# Patient Record
Sex: MALE | Race: WHITE | Employment: UNEMPLOYED | URBAN - METROPOLITAN AREA
[De-identification: names, ages, dates, MRNs, and addresses within clinical notes are randomized per-mention and may not be internally consistent; named-entity substitution may affect disease eponyms.]

---

## 2019-05-15 ENCOUNTER — OFFICE VISIT (OUTPATIENT)
Dept: FAMILY MEDICINE CLINIC | Age: 24
End: 2019-05-15

## 2019-05-15 VITALS
HEART RATE: 93 BPM | BODY MASS INDEX: 34.23 KG/M2 | RESPIRATION RATE: 18 BRPM | WEIGHT: 213 LBS | SYSTOLIC BLOOD PRESSURE: 113 MMHG | TEMPERATURE: 98.4 F | OXYGEN SATURATION: 90 % | HEIGHT: 66 IN | DIASTOLIC BLOOD PRESSURE: 71 MMHG

## 2019-05-15 DIAGNOSIS — F84.5 ASPERGER'S SYNDROME: ICD-10-CM

## 2019-05-15 DIAGNOSIS — J45.30 MILD PERSISTENT ASTHMA WITHOUT COMPLICATION: ICD-10-CM

## 2019-05-15 DIAGNOSIS — J30.9 ALLERGIC RHINITIS, UNSPECIFIED SEASONALITY, UNSPECIFIED TRIGGER: ICD-10-CM

## 2019-05-15 DIAGNOSIS — Z00.00 ROUTINE GENERAL MEDICAL EXAMINATION AT A HEALTH CARE FACILITY: Primary | ICD-10-CM

## 2019-05-15 RX ORDER — ALBUTEROL SULFATE 90 UG/1
1 AEROSOL, METERED RESPIRATORY (INHALATION)
Qty: 1 INHALER | Refills: 3 | Status: SHIPPED | OUTPATIENT
Start: 2019-05-15

## 2019-05-15 RX ORDER — SERTRALINE HYDROCHLORIDE 25 MG/1
150 TABLET, FILM COATED ORAL
COMMUNITY

## 2019-05-15 NOTE — PROGRESS NOTES
HPI 
Roscoe Britt is a 24 y.o. male who presents for a checkup. Last seen . Has Asperger's syndrome. Is living in a apartment that has been set aside for people on the spectrum. Has counselors that work with him on healthy living skills. He is exercising to [employer] which he can apply to things like [email or web address]. He does not need a particularly healthy diet but we did not get into the specifics. Has asthma for which he takes Singulair and a albuterol inhaler. Has not required albuterol inhaler in many months. Has a steroid inhaler that he is prescribed but he does not take. Follows with Dr. Stefan Venegas for this Follows up with psychiatry and is on a number of psych medications. He has not been getting blood work done with psych He has a family history of heart disease on his father's side. Father has atrial fibrillation, grandfather  at age 48 and paternal grandfather  at age 48. Has a family history of colon cancer in paternal grandmother and paternal great grandfather. Grandmother was diagnosed age 61. Family history maternal grandmother with diabetes PMHx: 
Past Medical History:  
Diagnosis Date  Asperger's syndrome  Elevated blood pressure reading without diagnosis of hypertension 2010  Rash and other nonspecific skin eruption 2010  Unspecified asthma(493.90) 2010  Unspecified pruritic disorder 2010 Meds:  
Current Outpatient Medications Medication Sig Dispense Refill  sertraline (ZOLOFT) 25 mg tablet Take 25 mg by mouth.  albuterol (PROVENTIL HFA, VENTOLIN HFA, PROAIR HFA) 90 mcg/actuation inhaler Take 1 Puff by inhalation every four (4) hours as needed for Wheezing.  1 Inhaler 3  
 fluticasone (FLONASE) 50 mcg/actuation nasal spray TAKE 1 SPRAY IN EACH NOSTRIL DAILY 1 Bottle 5  
 montelukast (SINGULAIR) 10 mg tablet TAKE ONE TABLET   BY MOUTH   DAILY 90 Tab 3  
  ARIPiprazole (ABILIFY) 20 mg tablet Take 20 mg by mouth daily.  albuterol (PROVENTIL, VENTOLIN) 90 mcg/Actuation inhaler Take 2 Puffs by inhalation every six (6) hours as needed.  dexmethylphenidate (FOCALIN XR) 20 mg MP50 Take 3 Tabs by mouth every morning.  OLANZAPINE (ZYPREXA PO) Take 20 mg by mouth nightly. Allergies: Allergies Allergen Reactions  Penicillins Unknown (comments)  Resperal [D-Ngbvgiuwdqsq-Hiqcsfufe-Pe-Dm] Other (comments) Nasal congestion and increased heart rate  Seroquel [Quetiapine] Unknown (comments)  Sulfa (Sulfonamide Antibiotics) Unknown (comments) Smoker: 
Social History Tobacco Use Smoking Status Never Smoker Smokeless Tobacco Never Used ETOH:  
Social History Substance and Sexual Activity Alcohol Use No  
 Alcohol/week: 0.0 oz  
 
 
FH: No family history on file. ROS:  
As listed in HPI. In addition: 
Constitutional:   No headache, fever, fatigue, weight loss or weight gain Cardiac:    No chest pain Resp:   No cough or shortness of breath Neuro   No loss of consciousness, dizziness, seizures Physical Exam: 
Pulse 93, temperature 98.4 °F (36.9 °C), resp. rate 18, height 5' 6\" (1.676 m), weight 213 lb (96.6 kg), SpO2 90 %. GEN: No apparent distress. Alert and oriented and responds to all questions appropriately. NEUROLOGIC:  No focal neurologic deficits. Strength and sensation grossly intact. Coordination and gait grossly intact. EXT: Well perfused. No edema. SKIN: No obvious rashes. Lungs clear to auscultation bilaterally CV regular rate and rhythm no murmur HEENT clear tympanic membrane right-sided nasal congestion and erythema, clear left side. Shotty lymphadenopathy, cobblestoning and postnasal drip Assessment and Plan Wellness Diet and exercise Surveillance labs Primarily here today because his psychiatrist asked if he was getting checkups. Medicines that he is taking should be getting annual routine blood work Asthma appears to be under good control They have worked on allergy trigger reduction Not requiring albuterol inhaler at all Probably okay to stay off the Pulmicort if it is such a chore to take it. Allergies Right-sided nasal congestion. Does not appear to be under great control. Suggest adding Zyrtec or Allegra Singulair Annual follow-up or sooner based on blood work Triglycerides 300. Probably diet LDL is normal which is reassuring given his family history and antipsychotic use Sugar is fine Creatinine is elevated, surprising for his age. Can be a side effect of Zyprexa. Check in 1 month with lab visit ICD-10-CM ICD-9-CM 1. Routine general medical examination at a health care facility Z00.00 V70.0 TSH 3RD GENERATION  
   METABOLIC PANEL, COMPREHENSIVE  
   CBC WITH AUTOMATED DIFF  
   LIPID PANEL  
   HEMOGLOBIN A1C WITH EAG  
   CT HANDLG&/OR CONVEY OF SPEC FOR TR OFFICE TO LAB  
   COLLECTION VENOUS BLOOD,VENIPUNCTURE 2. Asperger's syndrome F84.5 299.80   
3. Mild persistent asthma without complication Y37.48 660.40 albuterol (PROVENTIL HFA, VENTOLIN HFA, PROAIR HFA) 90 mcg/actuation inhaler 4. Allergic rhinitis, unspecified seasonality, unspecified trigger J30.9 477.9 AVS given. Pt expressed understanding of instructions

## 2019-05-15 NOTE — PROGRESS NOTES
. 
Chief Complaint Patient presents with  Physical  
 
.1. Have you been to the ER, urgent care clinic since your last visit? Hospitalized since your last visit? Urgent care for sore throat 2. Have you seen or consulted any other health care providers outside of the [address] [place] [address] since your last visit? Include any pap smears or colon screening. No 
 
. Health Maintenance Due Topic Date Due  
 DTaP/Tdap/Td series (2 - Td) 09/30/2013 Kiarra Beltrán

## 2019-05-15 NOTE — PATIENT INSTRUCTIONS
Food as Fuel: Care Instructions Your Care Instructions A healthy, balanced diet gives your body nutrients. Nutrients are like fuel for your body. They give you energy. And they keep your heart beating, your brain active, and your muscles working. They also help to build and strengthen bones, muscles, and other body tissues. Your body needs three major nutrients for energy. These are carbohydrate, protein, and fat. · Carbohydrate provides energy for your brain, muscles, heart, and lungs. It is found in bread, cereal, rice, pasta, fruits, vegetables, milk, yogurt, and sugar. · Protein provides energy and helps build and repair your body's cells. It is found in meat, poultry, fish, cooked dry beans, cheese, tofu and other soy products, nuts and seeds, and milk and milk products. · Fat provides energy, helps build the covering around nerves in your body, and helps make hormones. Fat is found in butter, margarine, oil, mayonnaise, salad dressing, and nuts. It is also found in most foods that come from animals, such as meat and milk products. Many foods also have fat added to them. Your body needs all three of these nutrients to be healthy. If you choose a good mix of foods, you can help your body get the right amounts of carbohydrate, protein, and fat. It can also keep you at a healthy weight. Follow-up care is a key part of your treatment and safety. Be sure to make and go to all appointments, and call your doctor if you are having problems. It's also a good idea to know your test results and keep a list of the medicines you take. How can you care for yourself at home? Eat a balanced diet · Try to eat variety of healthy foods every day. These include: 
? 5 to 8 ounces of bread, cereal, crackers, rice, or pasta. An ounce is about 1 slice of bread, ¾ cup of breakfast cereal, or ½ cup of cooked rice, cereal, or pasta. Choose whole-grain products for at least half of your grain servings. ? 2 to 3 cups of vegetables. Be sure to include: § Dark green vegetables such as broccoli and spinach. § Orange vegetables such as carrots and sweet potatoes. ? 1½ to 2 cups of fresh, frozen, or canned fruit. A banana, an orange, or a large apple equals 1 cup. ? 3 cups of nonfat or low-fat milk, yogurt, or other milk products. ? 5 to 6½ ounces of protein foods. These include chicken, fish, lean meat, beans, nuts, and seeds. One egg equals 1 ounce of meat, poultry, or fish. A ½ cup of cooked beans equals 2 ounces of protein. Stay fueled all day · Start your day with breakfast. If you don't have time to sit down for a bowl of cereal in the morning, try something that you can eat \"on the go. \" Try a piece of whole wheat bread with peanut butter or a container of yogurt with frozen berries mixed in. · Eat regularly scheduled meals and snacks. If you miss a meal, you may overeat at the next meal. Or you may choose a less healthy snack. · Drink enough water. (If you have kidney, heart, or liver disease and have to limit fluids, talk with your doctor before you increase your fluid intake.) Where can you learn more? Go to http://robby-carolyn.info/. Enter U125 in the search box to learn more about \"Food as Fuel: Care Instructions. \" Current as of: March 28, 2018 Content Version: 11.9 © 8130-4447 [employer]. Care instructions adapted under license by [employer] (which disclaims liability or warranty for this information). If you have questions about a medical condition or this instruction, always ask your healthcare professional. [place] [address] any warranty or liability for your use of this information.

## 2019-05-16 ENCOUNTER — TELEPHONE (OUTPATIENT)
Dept: FAMILY MEDICINE CLINIC | Age: 24
End: 2019-05-16

## 2019-05-16 DIAGNOSIS — R79.89 ELEVATED SERUM CREATININE: Primary | ICD-10-CM

## 2019-05-16 LAB
ALBUMIN SERPL-MCNC: 4.6 G/DL (ref 3.5–5.5)
ALBUMIN/GLOB SERPL: 2 {RATIO} (ref 1.2–2.2)
ALP SERPL-CCNC: 63 IU/L (ref 39–117)
ALT SERPL-CCNC: 24 IU/L (ref 0–44)
AST SERPL-CCNC: 19 IU/L (ref 0–40)
BASOPHILS # BLD AUTO: 0 X10E3/UL (ref 0–0.2)
BASOPHILS NFR BLD AUTO: 0 %
BILIRUB SERPL-MCNC: 0.4 MG/DL (ref 0–1.2)
BUN SERPL-MCNC: 12 MG/DL (ref 6–20)
BUN/CREAT SERPL: 9 (ref 9–20)
CALCIUM SERPL-MCNC: 9.6 MG/DL (ref 8.7–10.2)
CHLORIDE SERPL-SCNC: 104 MMOL/L (ref 96–106)
CHOLEST SERPL-MCNC: 162 MG/DL (ref 100–199)
CO2 SERPL-SCNC: 22 MMOL/L (ref 20–29)
CREAT SERPL-MCNC: 1.32 MG/DL (ref 0.76–1.27)
EOSINOPHIL # BLD AUTO: 0.1 X10E3/UL (ref 0–0.4)
EOSINOPHIL NFR BLD AUTO: 1 %
ERYTHROCYTE [DISTWIDTH] IN BLOOD BY AUTOMATED COUNT: 13.6 % (ref 12.3–15.4)
EST. AVERAGE GLUCOSE BLD GHB EST-MCNC: 97 MG/DL
GLOBULIN SER CALC-MCNC: 2.3 G/DL (ref 1.5–4.5)
GLUCOSE SERPL-MCNC: 61 MG/DL (ref 65–99)
HBA1C MFR BLD: 5 % (ref 4.8–5.6)
HCT VFR BLD AUTO: 45.9 % (ref 37.5–51)
HDLC SERPL-MCNC: 28 MG/DL
HGB BLD-MCNC: 15 G/DL (ref 13–17.7)
IMM GRANULOCYTES # BLD AUTO: 0 X10E3/UL (ref 0–0.1)
IMM GRANULOCYTES NFR BLD AUTO: 0 %
INTERPRETATION, 910389: NORMAL
LDLC SERPL CALC-MCNC: 74 MG/DL (ref 0–99)
LYMPHOCYTES # BLD AUTO: 2.3 X10E3/UL (ref 0.7–3.1)
LYMPHOCYTES NFR BLD AUTO: 30 %
MCH RBC QN AUTO: 31.1 PG (ref 26.6–33)
MCHC RBC AUTO-ENTMCNC: 32.7 G/DL (ref 31.5–35.7)
MCV RBC AUTO: 95 FL (ref 79–97)
MONOCYTES # BLD AUTO: 0.5 X10E3/UL (ref 0.1–0.9)
MONOCYTES NFR BLD AUTO: 6 %
NEUTROPHILS # BLD AUTO: 4.8 X10E3/UL (ref 1.4–7)
NEUTROPHILS NFR BLD AUTO: 63 %
PLATELET # BLD AUTO: 238 X10E3/UL (ref 150–379)
POTASSIUM SERPL-SCNC: 4.7 MMOL/L (ref 3.5–5.2)
PROT SERPL-MCNC: 6.9 G/DL (ref 6–8.5)
RBC # BLD AUTO: 4.83 X10E6/UL (ref 4.14–5.8)
SODIUM SERPL-SCNC: 142 MMOL/L (ref 134–144)
TRIGL SERPL-MCNC: 300 MG/DL (ref 0–149)
TSH SERPL DL<=0.005 MIU/L-ACNC: 0.69 UIU/ML (ref 0.45–4.5)
VLDLC SERPL CALC-MCNC: 60 MG/DL (ref 5–40)
WBC # BLD AUTO: 7.6 X10E3/UL (ref 3.4–10.8)

## 2019-05-16 NOTE — TELEPHONE ENCOUNTER
Your blood sugar looks fine. Your triglycerides are high, this is the fat in your blood. It is very dependent on your diet. You need to avoid fried foods, greasy foods, saturated fats which can be found and red meat and dairy products. We like the kind of fat that you can get from fish and vegetables. Chicken would be a reasonable compromise. These diet changes would also help your HDL \"good cholesterol\". Your bad cholesterol actually looks fine. Your kidney function is a little off.   I suspect this is a fluke but I would like to check blood work in 1 month to be sure

## 2019-05-16 NOTE — TELEPHONE ENCOUNTER
Ms. Riya Mccoyjorge alberto notified Dr. Evita Chawla said Your blood sugar looks fine.     Your triglycerides are high, this is the fat in your blood. It is very dependent on your diet. You need to avoid fried foods, greasy foods, saturated fats which can be found and red meat and dairy products. We like the kind of fat that you can get from fish and vegetables. Chicken would be a reasonable compromise. These diet changes would also help your HDL \"good cholesterol\". Your bad cholesterol actually looks fine.     Your kidney function is a little off.   I suspect this is a fluke but I would like to check blood work in 1 month to be sure    He voiced understanding and will call back to set up lab apt

## 2019-05-20 ENCOUNTER — TELEPHONE (OUTPATIENT)
Dept: FAMILY MEDICINE CLINIC | Age: 24
End: 2019-05-20

## 2019-05-20 NOTE — TELEPHONE ENCOUNTER
Patients mom Gemma Navarro is calling to speak with a  Nurse. She stated that she was told that the patient needed to come in for further testing and she would like to know if an appointment is needed.

## 2019-05-21 NOTE — TELEPHONE ENCOUNTER
Unable to reach mother or leave a message.  Need to let her know to set up lab appointment for [facility] for the middle of June

## 2019-05-22 ENCOUNTER — TELEPHONE (OUTPATIENT)
Dept: FAMILY MEDICINE CLINIC | Age: 24
End: 2019-05-22

## 2019-05-22 NOTE — TELEPHONE ENCOUNTER
Mother calling to get lab order faxed to [facility]. She will call to make appt at the end of the month for June.      I got her correct number, she can be reached at [phone]

## 2019-05-22 NOTE — TELEPHONE ENCOUNTER
verified. Informed mother (Judith-HIPAA) that the lab requisition form faxed. Understanding verbalized.

## 2019-05-31 ENCOUNTER — HOSPITAL ENCOUNTER (EMERGENCY)
Age: 24
Discharge: HOME | End: 2019-05-31
Attending: EMERGENCY MEDICINE
Payer: COMMERCIAL

## 2019-05-31 VITALS
DIASTOLIC BLOOD PRESSURE: 49 MMHG | HEART RATE: 77 BPM | SYSTOLIC BLOOD PRESSURE: 118 MMHG | TEMPERATURE: 98.4 F | OXYGEN SATURATION: 99 % | RESPIRATION RATE: 18 BRPM

## 2019-05-31 DIAGNOSIS — K92.2 GASTROINTESTINAL HEMORRHAGE, UNSPECIFIED GASTROINTESTINAL HEMORRHAGE TYPE: Primary | ICD-10-CM

## 2019-05-31 LAB
ALBUMIN SERPL-MCNC: 4.3 G/DL (ref 3.5–5)
ALBUMIN/GLOB SERPL: 1.3 {RATIO} (ref 1.1–2.2)
ALP SERPL-CCNC: 67 U/L (ref 45–117)
ALT SERPL-CCNC: 49 U/L (ref 12–78)
ANION GAP SERPL CALC-SCNC: 5 MMOL/L (ref 5–15)
AST SERPL-CCNC: 26 U/L (ref 15–37)
BASOPHILS # BLD: 0.1 K/UL (ref 0–0.1)
BASOPHILS NFR BLD: 1 % (ref 0–1)
BILIRUB SERPL-MCNC: 0.4 MG/DL (ref 0.2–1)
BUN SERPL-MCNC: 14 MG/DL (ref 6–20)
BUN/CREAT SERPL: 13 (ref 12–20)
CALCIUM SERPL-MCNC: 9.5 MG/DL (ref 8.5–10.1)
CHLORIDE SERPL-SCNC: 109 MMOL/L (ref 97–108)
CO2 SERPL-SCNC: 29 MMOL/L (ref 21–32)
COMMENT, HOLDF: NORMAL
CREAT SERPL-MCNC: 1.11 MG/DL (ref 0.7–1.3)
DIFFERENTIAL METHOD BLD: NORMAL
EOSINOPHIL # BLD: 0.1 K/UL (ref 0–0.4)
EOSINOPHIL NFR BLD: 1 % (ref 0–7)
ERYTHROCYTE [DISTWIDTH] IN BLOOD BY AUTOMATED COUNT: 12.5 % (ref 11.5–14.5)
GLOBULIN SER CALC-MCNC: 3.3 G/DL (ref 2–4)
GLUCOSE SERPL-MCNC: 82 MG/DL (ref 65–100)
HCT VFR BLD AUTO: 49 % (ref 36.6–50.3)
HEMOCCULT STL QL: POSITIVE
HGB BLD-MCNC: 16.1 G/DL (ref 12.1–17)
IMM GRANULOCYTES # BLD AUTO: 0 K/UL (ref 0–0.04)
IMM GRANULOCYTES NFR BLD AUTO: 0 % (ref 0–0.5)
LYMPHOCYTES # BLD: 3 K/UL (ref 0.8–3.5)
LYMPHOCYTES NFR BLD: 36 % (ref 12–49)
MCH RBC QN AUTO: 31.4 PG (ref 26–34)
MCHC RBC AUTO-ENTMCNC: 32.9 G/DL (ref 30–36.5)
MCV RBC AUTO: 95.7 FL (ref 80–99)
MONOCYTES # BLD: 0.5 K/UL (ref 0–1)
MONOCYTES NFR BLD: 5 % (ref 5–13)
NEUTS SEG # BLD: 4.8 K/UL (ref 1.8–8)
NEUTS SEG NFR BLD: 57 % (ref 32–75)
NRBC # BLD: 0 K/UL (ref 0–0.01)
NRBC BLD-RTO: 0 PER 100 WBC
PLATELET # BLD AUTO: 203 K/UL (ref 150–400)
PMV BLD AUTO: 10.8 FL (ref 8.9–12.9)
POTASSIUM SERPL-SCNC: 3.9 MMOL/L (ref 3.5–5.1)
PROT SERPL-MCNC: 7.6 G/DL (ref 6.4–8.2)
RBC # BLD AUTO: 5.12 M/UL (ref 4.1–5.7)
SAMPLES BEING HELD,HOLD: NORMAL
SODIUM SERPL-SCNC: 143 MMOL/L (ref 136–145)
WBC # BLD AUTO: 8.3 K/UL (ref 4.1–11.1)

## 2019-05-31 PROCEDURE — 36415 COLL VENOUS BLD VENIPUNCTURE: CPT

## 2019-05-31 PROCEDURE — 80053 COMPREHEN METABOLIC PANEL: CPT

## 2019-05-31 PROCEDURE — 82272 OCCULT BLD FECES 1-3 TESTS: CPT

## 2019-05-31 PROCEDURE — 85025 COMPLETE CBC W/AUTO DIFF WBC: CPT

## 2019-05-31 PROCEDURE — 99283 EMERGENCY DEPT VISIT LOW MDM: CPT

## 2019-06-01 NOTE — ED NOTES
MD reviewed discharge instructions and options with patient and patient verbalized understanding. RN reviewed discharge instructions using teachback method. Pt ambulated to exit without difficulty and in no signs of acute distress escorted by family, and they  will drive home. No complaints or needs expressed at this time. Patient was counseled on medications prescribed at discharge. VSS, verbalized relief from most intense pain. Patient to call GI in the morning for appointment.

## 2019-06-01 NOTE — ED TRIAGE NOTES
Pt presents ambulatory to department. Pt states he has been having diarrhea that began this afternoon and at 1930 he noticed some bright red blood in his stool.

## 2019-06-01 NOTE — ED PROVIDER NOTES
HPI       24y M here with blood in his stool. Has had diarrhea today. Around 7:30p today he noticed blood with the stool. No abdominal pain. No recent travel. No recent abx use. No dizziness. No syncope. No nausea or vomiting. Past Medical History:   Diagnosis Date    Asperger's syndrome     Elevated blood pressure reading without diagnosis of hypertension 2/26/2010    Rash and other nonspecific skin eruption 2/26/2010    Unspecified asthma(493.90) 2/26/2010    Unspecified pruritic disorder 2/26/2010       Past Surgical History:   Procedure Laterality Date    HX TYMPANOSTOMY      1998         History reviewed. No pertinent family history.     Social History     Socioeconomic History    Marital status: SINGLE     Spouse name: Not on file    Number of children: Not on file    Years of education: Not on file    Highest education level: Not on file   Occupational History    Not on file   Social Needs    Financial resource strain: Not on file    Food insecurity:     Worry: Not on file     Inability: Not on file    Transportation needs:     Medical: Not on file     Non-medical: Not on file   Tobacco Use    Smoking status: Never Smoker    Smokeless tobacco: Never Used   Substance and Sexual Activity    Alcohol use: No     Alcohol/week: 0.0 oz    Drug use: No    Sexual activity: Never   Lifestyle    Physical activity:     Days per week: Not on file     Minutes per session: Not on file    Stress: Not on file   Relationships    Social connections:     Talks on phone: Not on file     Gets together: Not on file     Attends Voodoo service: Not on file     Active member of club or organization: Not on file     Attends meetings of clubs or organizations: Not on file     Relationship status: Not on file    Intimate partner violence:     Fear of current or ex partner: Not on file     Emotionally abused: Not on file     Physically abused: Not on file     Forced sexual activity: Not on file   Other Topics Concern    Not on file   Social History Narrative    21yo with Asperger's lives with mom. Unable to drive. Has DD waiver - personal assistance 30 hours/week. ALLERGIES: Penicillins; Resperal [w-zuvgcggunbwr-zbpfgfuax-pe-dm]; Seroquel [quetiapine]; and Sulfa (sulfonamide antibiotics)    Review of Systems   Review of Systems   Constitutional: (-) weight loss. HEENT: (-) stiff neck   Eyes: (-) discharge. Respiratory: (-) cough. Cardiovascular: (-) syncope. Gastrointestinal: (-) blood in stool. Genitourinary: (-) hematuria. Musculoskeletal: (-) myalgias. Neurological: (-) seizure. Skin: (-) petechiae  Lymph/Immunologic: (-) enlarged lymph nodes  All other systems reviewed and are negative. Vitals:    05/31/19 2011   Pulse: 88   SpO2: 98%            Physical Exam Nursing note and vitals reviewed. Constitutional: oriented to person, place, and time. appears well-developed and well-nourished. No distress. Head: Normocephalic and atraumatic. Sclera anicteric  Nose: No rhinorrhea  Mouth/Throat: Oropharynx is clear and moist. Pharynx normal  Eyes: Conjunctivae are normal. Pupils are equal, round, and reactive to light. Right eye exhibits no discharge. Left eye exhibits no discharge. Neck: Painless normal range of motion. Neck supple. No LAD. Cardiovascular: Normal rate, regular rhythm, normal heart sounds and intact distal pulses. Exam reveals no gallop and no friction rub. No murmur heard. Pulmonary/Chest:  No respiratory distress. No wheezes. No rales. No rhonchi. No increased work of breathing. No accessory muscle use. Good air exchange throughout. Abdominal: soft, non-tender, no rebound or guarding. No hepatosplenomegaly. Normal bowel sounds throughout. Back: no tenderness to palpation, no deformities, no CVA tenderness  Extremities/Musculoskeletal: Normal range of motion. no tenderness. No edema. Distal extremities are neurovasc intact. Lymphadenopathy:   No adenopathy. Neurological:  Alert and oriented to person, place, and time. Coordination normal. CN 2-12 intact. Motor and sensory function intact. Skin: Skin is warm and dry. No rash noted. No pallor. MDM 24y M here with blood in his stool. Plan for labs and reeval. Appears well. Procedures      11:15 PM  Pt continues to feel well. No recurrence of sx's. Labs reassuring. Vitals stable while in the ED. Offered admission but they prefer to go home and will come back if sx's come back or worsen. Otherwise will follow-up with GI early next week.

## 2019-06-01 NOTE — DISCHARGE INSTRUCTIONS
Patient Education        Gastrointestinal Bleeding: Care Instructions  Your Care Instructions    The digestive or gastrointestinal tract goes from the mouth to the anus. It is often called the GI tract. Bleeding can happen anywhere in the GI tract. It may be caused by an ulcer, an infection, or cancer. It may also be caused by medicines such as aspirin or ibuprofen. Light bleeding may not cause any symptoms at first. But if you continue to bleed for a while, you may feel very weak or tired. Sudden, heavy bleeding means you need to see a doctor right away. This kind of bleeding can be very dangerous. But it can usually be cured or controlled. The doctor may do some tests to find the cause of your bleeding. Follow-up care is a key part of your treatment and safety. Be sure to make and go to all appointments, and call your doctor if you are having problems. It's also a good idea to know your test results and keep a list of the medicines you take. How can you care for yourself at home? · Be safe with medicines. Take your medicines exactly as prescribed. Call your doctor if you think you are having a problem with your medicine. You will get more details on the specific medicines your doctor prescribes. · Do not take aspirin or other anti-inflammatory medicines, such as naproxen (Aleve) or ibuprofen (Advil, Motrin), without talking to your doctor first. Ask your doctor if it is okay to use acetaminophen (Tylenol). · Do not drink alcohol. · The bleeding may make you lose iron. So it's important to eat foods that have a lot of iron. These include red meat, shellfish, poultry, and eggs. They also include beans, raisins, whole-grain breads, and leafy green vegetables. If you want help planning meals, you can make an appointment with a dietitian. When should you call for help? Call 911 anytime you think you may need emergency care.  For example, call if:    · You have sudden, severe belly pain.     · You vomit blood or what looks like coffee grounds.     · You passed out (lost consciousness).     · Your stools are maroon or very bloody.    Call your doctor now or seek immediate medical care if:    · You are dizzy or lightheaded, or you feel like you may faint.     · Your stools are black and look like tar, or they have streaks of blood.     · You have belly pain.     · You vomit or have nausea.     · You have trouble swallowing, or it hurts when you swallow.    Watch closely for changes in your health, and be sure to contact your doctor if:    · You do not get better as expected. Where can you learn more? Go to http://robby-carolyn.info/. Enter T282 in the search box to learn more about \"Gastrointestinal Bleeding: Care Instructions. \"  Current as of: September 23, 2018  Content Version: 11.9  © 9506-4882 [employer]. Care instructions adapted under license by [employer] (which disclaims liability or warranty for this information). If you have questions about a medical condition or this instruction, always ask your healthcare professional. [address] any warranty or liability for your use of this information.

## 2019-06-11 ENCOUNTER — DOCUMENTATION ONLY (OUTPATIENT)
Dept: FAMILY MEDICINE CLINIC | Age: 24
End: 2019-06-11

## 2019-06-11 NOTE — PROGRESS NOTES
Chief Complaint   Patient presents with    Documentation     [address] \"DR. Delmi Sorto"     Last office notes and most recent labs faxed to [phone] and confirmation page received.

## 2019-08-26 ENCOUNTER — ANESTHESIA EVENT (OUTPATIENT)
Dept: ENDOSCOPY | Age: 24
End: 2019-08-26
Payer: COMMERCIAL

## 2019-08-26 NOTE — ANESTHESIA PREPROCEDURE EVALUATION
Relevant Problems   No relevant active problems       Anesthetic History   No history of anesthetic complications            Review of Systems / Medical History  Patient summary reviewed, nursing notes reviewed and pertinent labs reviewed    Pulmonary            Asthma        Neuro/Psych   Within defined limits           Cardiovascular  Within defined limits                     GI/Hepatic/Renal  Within defined limits              Endo/Other        Obesity     Other Findings            Physical Exam    Airway  Mallampati: II  TM Distance: > 6 cm  Neck ROM: normal range of motion   Mouth opening: Normal     Cardiovascular  Regular rate and rhythm,  S1 and S2 normal,  no murmur, click, rub, or gallop             Dental  No notable dental hx       Pulmonary  Breath sounds clear to auscultation               Abdominal  GI exam deferred       Other Findings            Anesthetic Plan    ASA: 2  Anesthesia type: MAC            Anesthetic plan and risks discussed with: Patient

## 2019-08-27 ENCOUNTER — HOSPITAL ENCOUNTER (OUTPATIENT)
Age: 24
Setting detail: OUTPATIENT SURGERY
Discharge: HOME | End: 2019-08-27
Attending: SPECIALIST | Admitting: SPECIALIST
Payer: COMMERCIAL

## 2019-08-27 ENCOUNTER — ANESTHESIA (OUTPATIENT)
Dept: ENDOSCOPY | Age: 24
End: 2019-08-27
Payer: COMMERCIAL

## 2019-08-27 VITALS
DIASTOLIC BLOOD PRESSURE: 70 MMHG | OXYGEN SATURATION: 96 % | WEIGHT: 213 LBS | HEART RATE: 70 BPM | HEIGHT: 66 IN | SYSTOLIC BLOOD PRESSURE: 119 MMHG | BODY MASS INDEX: 34.23 KG/M2 | RESPIRATION RATE: 15 BRPM | TEMPERATURE: 97.7 F

## 2019-08-27 PROCEDURE — 74011250636 HC RX REV CODE- 250/636: Performed by: NURSE ANESTHETIST, CERTIFIED REGISTERED

## 2019-08-27 PROCEDURE — 76060000032 HC ANESTHESIA 0.5 TO 1 HR: Performed by: SPECIALIST

## 2019-08-27 PROCEDURE — 76040000007: Performed by: SPECIALIST

## 2019-08-27 RX ORDER — ATROPINE SULFATE 0.1 MG/ML
0.5 INJECTION INTRAVENOUS
Status: DISCONTINUED | OUTPATIENT
Start: 2019-08-27 | End: 2019-08-27 | Stop reason: HOSPADM

## 2019-08-27 RX ORDER — SODIUM CHLORIDE 0.9 % (FLUSH) 0.9 %
5-40 SYRINGE (ML) INJECTION EVERY 8 HOURS
Status: DISCONTINUED | OUTPATIENT
Start: 2019-08-27 | End: 2019-08-27 | Stop reason: HOSPADM

## 2019-08-27 RX ORDER — SODIUM CHLORIDE 9 MG/ML
INJECTION, SOLUTION INTRAVENOUS
Status: DISCONTINUED | OUTPATIENT
Start: 2019-08-27 | End: 2019-08-27 | Stop reason: HOSPADM

## 2019-08-27 RX ORDER — SODIUM CHLORIDE 0.9 % (FLUSH) 0.9 %
5-40 SYRINGE (ML) INJECTION AS NEEDED
Status: DISCONTINUED | OUTPATIENT
Start: 2019-08-27 | End: 2019-08-27 | Stop reason: HOSPADM

## 2019-08-27 RX ORDER — LIDOCAINE HYDROCHLORIDE 20 MG/ML
INJECTION, SOLUTION EPIDURAL; INFILTRATION; INTRACAUDAL; PERINEURAL AS NEEDED
Status: DISCONTINUED | OUTPATIENT
Start: 2019-08-27 | End: 2019-08-27 | Stop reason: HOSPADM

## 2019-08-27 RX ORDER — EPINEPHRINE 0.1 MG/ML
1 INJECTION INTRACARDIAC; INTRAVENOUS
Status: DISCONTINUED | OUTPATIENT
Start: 2019-08-27 | End: 2019-08-27 | Stop reason: HOSPADM

## 2019-08-27 RX ORDER — FLUMAZENIL 0.1 MG/ML
0.2 INJECTION INTRAVENOUS
Status: DISCONTINUED | OUTPATIENT
Start: 2019-08-27 | End: 2019-08-27 | Stop reason: HOSPADM

## 2019-08-27 RX ORDER — SODIUM CHLORIDE 9 MG/ML
50 INJECTION, SOLUTION INTRAVENOUS CONTINUOUS
Status: DISCONTINUED | OUTPATIENT
Start: 2019-08-27 | End: 2019-08-27 | Stop reason: HOSPADM

## 2019-08-27 RX ORDER — NALOXONE HYDROCHLORIDE 0.4 MG/ML
0.4 INJECTION, SOLUTION INTRAMUSCULAR; INTRAVENOUS; SUBCUTANEOUS
Status: DISCONTINUED | OUTPATIENT
Start: 2019-08-27 | End: 2019-08-27 | Stop reason: HOSPADM

## 2019-08-27 RX ORDER — DEXTROMETHORPHAN/PSEUDOEPHED 2.5-7.5/.8
1.2 DROPS ORAL
Status: DISCONTINUED | OUTPATIENT
Start: 2019-08-27 | End: 2019-08-27 | Stop reason: HOSPADM

## 2019-08-27 RX ORDER — PROPOFOL 10 MG/ML
INJECTION, EMULSION INTRAVENOUS AS NEEDED
Status: DISCONTINUED | OUTPATIENT
Start: 2019-08-27 | End: 2019-08-27 | Stop reason: HOSPADM

## 2019-08-27 RX ADMIN — PROPOFOL 80 MG: 10 INJECTION, EMULSION INTRAVENOUS at 08:25

## 2019-08-27 RX ADMIN — PROPOFOL 20 MG: 10 INJECTION, EMULSION INTRAVENOUS at 08:27

## 2019-08-27 RX ADMIN — PROPOFOL 50 MG: 10 INJECTION, EMULSION INTRAVENOUS at 08:34

## 2019-08-27 RX ADMIN — PROPOFOL 50 MG: 10 INJECTION, EMULSION INTRAVENOUS at 08:44

## 2019-08-27 RX ADMIN — PROPOFOL 50 MG: 10 INJECTION, EMULSION INTRAVENOUS at 08:29

## 2019-08-27 RX ADMIN — PROPOFOL 20 MG: 10 INJECTION, EMULSION INTRAVENOUS at 08:39

## 2019-08-27 RX ADMIN — SODIUM CHLORIDE: 900 INJECTION, SOLUTION INTRAVENOUS at 08:18

## 2019-08-27 RX ADMIN — PROPOFOL 30 MG: 10 INJECTION, EMULSION INTRAVENOUS at 08:36

## 2019-08-27 RX ADMIN — PROPOFOL 50 MG: 10 INJECTION, EMULSION INTRAVENOUS at 08:30

## 2019-08-27 RX ADMIN — PROPOFOL 20 MG: 10 INJECTION, EMULSION INTRAVENOUS at 08:43

## 2019-08-27 RX ADMIN — LIDOCAINE HYDROCHLORIDE 50 MG: 20 INJECTION, SOLUTION EPIDURAL; INFILTRATION; INTRACAUDAL; PERINEURAL at 08:25

## 2019-08-27 NOTE — ANESTHESIA POSTPROCEDURE EVALUATION
Post-Anesthesia Evaluation and Assessment    Patient: Gordon Pena MRN: [id number]  SSN: [id number]    YOB: 1995  Age: 24 y.o. Sex: male      I have evaluated the patient and they are stable and ready for discharge from the PACU. Cardiovascular Function/Vital Signs  Visit Vitals  BP 96/54   Pulse 73   Resp 14   Ht 5' 6\" (1.676 m)   Wt 96.6 kg (213 lb)   SpO2 97%   BMI 34.38 kg/m²       Patient is status post MAC anesthesia for Procedure(s):  COLONOSCOPY. Nausea/Vomiting: None    Postoperative hydration reviewed and adequate. Pain:  Pain Scale 1: Numeric (0 - 10) (08/27/19 0753)  Pain Intensity 1: 0 (08/27/19 0753)   Managed    Neurological Status: At baseline    Mental Status, Level of Consciousness: Alert and  oriented to person, place, and time    Pulmonary Status:   O2 Device: Nasal cannula (08/27/19 0847)   Adequate oxygenation and airway patent    Complications related to anesthesia: None    Post-anesthesia assessment completed. No concerns    Signed By: Abby Estrada MD     August 27, 2019              Procedure(s):  COLONOSCOPY.     MAC    <BSHSIANPOST>    Vitals Value Taken Time   BP 96/54 8/27/2019  8:47 AM   Temp     Pulse 73 8/27/2019  8:47 AM   Resp 14 8/27/2019  8:47 AM   SpO2 97 % 8/27/2019  8:47 AM

## 2019-08-27 NOTE — H&P
Colonoscopy History and Physical      The patient was seen and examined. Date of last colonoscopy: none, Polyps  No      The airway was assessed and documented. The problem list, past medical history, and medications were reviewed. Patient Active Problem List   Diagnosis Code    Unspecified asthma(493.90) J45.909    Attention deficit disorder F98.8    Asperger's syndrome F84.5     Social History     Socioeconomic History    Marital status: SINGLE     Spouse name: Not on file    Number of children: Not on file    Years of education: Not on file    Highest education level: Not on file   Occupational History    Not on file   Social Needs    Financial resource strain: Not on file    Food insecurity:     Worry: Not on file     Inability: Not on file    Transportation needs:     Medical: Not on file     Non-medical: Not on file   Tobacco Use    Smoking status: Never Smoker    Smokeless tobacco: Never Used   Substance and Sexual Activity    Alcohol use: No     Alcohol/week: 0.0 standard drinks    Drug use: No    Sexual activity: Never   Lifestyle    Physical activity:     Days per week: Not on file     Minutes per session: Not on file    Stress: Not on file   Relationships    Social connections:     Talks on phone: Not on file     Gets together: Not on file     Attends [employer] service: Not on file     Active member of club or organization: Not on file     Attends meetings of clubs or organizations: Not on file     Relationship status: Not on file    Intimate partner violence:     Fear of current or ex partner: Not on file     Emotionally abused: Not on file     Physically abused: Not on file     Forced sexual activity: Not on file   Other Topics Concern    Not on file   Social History Narrative    21yo with Asperger's lives with mom. Unable to drive. Has DD waiver - personal assistance 30 hours/week.       Past Medical History:   Diagnosis Date    Asperger's syndrome     Elevated blood pressure reading without diagnosis of hypertension 2/26/2010    Rash and other nonspecific skin eruption 2/26/2010    Unspecified asthma(493.90) 2/26/2010    Unspecified pruritic disorder 2/26/2010         Prior to Admission Medications   Prescriptions Last Dose Informant Patient Reported? Taking? ARIPiprazole (ABILIFY) 20 mg tablet 8/26/2019 at Unknown time  Yes Yes   Sig: Take 20 mg by mouth daily. OLANZAPINE (ZYPREXA PO) 8/26/2019 at Unknown time  Yes Yes   Sig: Take 20 mg by mouth nightly. albuterol (PROVENTIL HFA, VENTOLIN HFA, PROAIR HFA) 90 mcg/actuation inhaler Unknown at Unknown time  No No   Sig: Take 1 Puff by inhalation every four (4) hours as needed for Wheezing. dexmethylphenidate (FOCALIN XR) 20 mg MP50 8/26/2019 at Unknown time  Yes Yes   Sig: Take 3 Tabs by mouth every morning. fluticasone (FLONASE) 50 mcg/actuation nasal spray 7/27/2019 at Unknown time  No Yes   Sig: TAKE 1 SPRAY IN EACH NOSTRIL DAILY   montelukast (SINGULAIR) 10 mg tablet 8/26/2019 at Unknown time  No Yes   Sig: TAKE ONE TABLET   BY MOUTH   DAILY   sertraline (ZOLOFT) 25 mg tablet 8/26/2019 at Unknown time  Yes Yes   Sig: Take 25 mg by mouth. Facility-Administered Medications: None       The patient was seen and examined in the endoscopy suite. The airway was assessed and docuemented. The problem list and medications were reviewed. Chief complaint, history of present illness, and review of systems and Past medical History are positive for: hematochezia and family history of colon cancer    The heart, lungs and mental status were satisfactory for the administration of sedation and for the procedure. I discussed with the patient the objectives, risks, consequences and alternatives to the procedure.      Plan: Endoscopic procedure with sedation     Shanika Nur MD   8/27/2019  8:26 AM

## 2019-08-27 NOTE — PROCEDURES
[address], [address] [place] [address]                 Colonoscopy Procedure Note    Indications:   See Preoperative Diagnosis above  Referring Physician: Shyann Corona MD  Anesthesia/Sedation: MAC anesthesia Propofol  Endoscopist:  Dr. Lucy Mon  Assistant:  Endoscopy Technician-1: Dory Romero IV  Endoscopy RN-1: Tabby Gomes RN  Preoperative diagnosis: FAMILY HISTORY OF MALIGNANT NEOPLASM OF GASTROINTESTINAL TRACT  Postoperative diagnosis: Normal     Procedure in Detail:  Informed consent was obtained for the procedure, including sedation. Risks of perforation, hemorrhage, adverse drug reaction, and aspiration were discussed. The patient was placed in the left lateral decubitus position. Based on the pre-procedure assessment, including review of the patient's medical history, medications, allergies, and review of systems, he had been deemed to be an appropriate candidate for moderate sedation; he was therefore sedated with the medications listed above. The patient was monitored continuously with ECG tracing, pulse oximetry, blood pressure monitoring, and direct observations. A rectal examination was performed. The [id number] was inserted into the rectum and advanced under direct vision to the cecum, which was identified by the ileocecal valve and appendiceal orifice. The quality of the colonic preparation was good. A careful inspection was made as the colonoscope was withdrawn, including a retroflexed view of the rectum; findings and interventions are described below. Appropriate photodocumentation was obtained. Findings:  Rectum: normal  Sigmoid: normal  Descending Colon: normal  Transverse Colon: normal  Ascending Colon: normal  Cecum: normal      Specimens:    none    EBL: None    Complications: None; patient tolerated the procedure well.     Recommendations:      - High fiber diet        Signed By: Lucy Mon MD August 27, 2019

## 2019-08-27 NOTE — DISCHARGE INSTRUCTIONS
Macarena Persaud  [id number]  1995    COLON DISCHARGE INSTRUCTIONS  Discomfort:  Redness at IV site- apply warm compress to area; if redness or soreness persist- contact your physician  There may be a slight amount of blood passed from the rectum  Gaseous discomfort- walking, belching will help relieve any discomfort  You may not operate a vehicle for 12 hours  You may not engage in an occupation involving machinery or appliances for rest of today  You may not drink alcoholic beverages for at least 12 hours  Avoid making any critical decisions for at least 24 hour  DIET:   High fiber diet. - however -  remember your colon is empty and a heavy meal will produce gas. Avoid these foods:  vegetables, fried / greasy foods, carbonated drinks for today. MEDICATIONS:      Regarding Aspirin or Nonsteroidal medications, please see below. ACTIVITY:  You may resume your normal daily activities it is recommended that you spend the remainder of the day resting -  avoid any strenuous activity. CALL M.D. ANY SIGN OF:  Increasing pain, nausea, vomiting  Abdominal distension (swelling)  New increased bleeding (oral or rectal)  Fever (chills)  Pain in chest area  Bloody discharge from nose or mouth  Shortness of breath    You may  take any Advil, Aspirin, Ibuprofen, Motrin, Aleve, or Goodys for 10 days, ONLY  Tylenol as needed for pain.     Post procedure diagnosis: Normal       Follow-up Instructions:   Call Dr. Jordan Kenny office if you develop severe pain or bloody stools  Telephone #  [phone]      Ben Hinton from Nurse    The following personal items collected during your admission are returned to you:   Dental Appliance: Dental Appliances: None  Vision:    Hearing Aid:    Jewelry:    Clothing:    Other Valuables:    Valuables sent to safe:

## 2019-08-27 NOTE — PERIOP NOTES

## 2019-08-27 NOTE — ROUTINE PROCESS
Ana Johnemily  1995  [id number]    Situation:  Verbal report received from: GARRICK Boo RN  Procedure: Procedure(s):  COLONOSCOPY    Background:    Preoperative diagnosis: FAMILY HISTORY OF MALIGNANT NEOPLASM OF GASTROINTESTINAL TRACT  Postoperative diagnosis: Normal     :  Dr. Cindy Reddy  Assistant(s): Endoscopy Technician-1: Linda García  Endoscopy RN-1: Macknezie Metcalf RN    Specimens: * No specimens in log *  H. Pylori  no    Assessment:  Intra-procedure medications   Anesthesia gave intra-procedure sedation and medications, see anesthesia flow sheet yes    Intravenous fluids: NS@ KVO     Vital signs stable     Abdominal assessment: round and soft     Recommendation:  Discharge patient per MD order.     Family or Friend   Permission to share finding with family or friend yes

## 2019-12-05 ENCOUNTER — HOSPITAL ENCOUNTER (OUTPATIENT)
Dept: CT IMAGING | Age: 24
Discharge: HOME | End: 2019-12-05
Attending: NURSE PRACTITIONER
Payer: COMMERCIAL

## 2019-12-05 DIAGNOSIS — R31.0 GROSS HEMATURIA: ICD-10-CM

## 2019-12-05 PROCEDURE — 74178 CT ABD&PLV WO CNTR FLWD CNTR: CPT

## 2019-12-05 PROCEDURE — 74011636320 HC RX REV CODE- 636/320: Performed by: NURSE PRACTITIONER

## 2019-12-05 RX ORDER — SODIUM CHLORIDE 0.9 % (FLUSH) 0.9 %
10 SYRINGE (ML) INJECTION
Status: COMPLETED | OUTPATIENT
Start: 2019-12-05 | End: 2019-12-05

## 2019-12-05 RX ADMIN — IOPAMIDOL 100 ML: 755 INJECTION, SOLUTION INTRAVENOUS at 17:04

## 2019-12-05 RX ADMIN — Medication 10 ML: at 17:04

## 2020-02-28 ENCOUNTER — OFFICE VISIT (OUTPATIENT)
Dept: FAMILY MEDICINE CLINIC | Age: 25
End: 2020-02-28

## 2020-02-28 VITALS
SYSTOLIC BLOOD PRESSURE: 127 MMHG | RESPIRATION RATE: 14 BRPM | TEMPERATURE: 98.3 F | HEIGHT: 66 IN | OXYGEN SATURATION: 96 % | WEIGHT: 204 LBS | HEART RATE: 94 BPM | BODY MASS INDEX: 32.78 KG/M2 | DIASTOLIC BLOOD PRESSURE: 80 MMHG

## 2020-02-28 DIAGNOSIS — J02.9 SORE THROAT: ICD-10-CM

## 2020-02-28 DIAGNOSIS — J30.9 ALLERGIC RHINITIS, UNSPECIFIED SEASONALITY, UNSPECIFIED TRIGGER: Primary | ICD-10-CM

## 2020-02-28 LAB
S PYO AG THROAT QL: NEGATIVE
VALID INTERNAL CONTROL?: YES

## 2020-02-28 NOTE — PROGRESS NOTES
EWA Simental is a 21 y.o. male who presents with congestion, and scratchy throat for 2 weeks. Primarily right-sided. Right ear seems to be involved. He has not felt rundown tired or sick. He has noticed the sore throat mostly when swallowing. The soreness seemed to move to the other side in the last few days prompting the visit. Mother is accompanying him and augments the history. Does not feel that he is behaving very sick and was not too worried about the illness. He does have seasonal allergies, sees an allergist but does not take any medicine for because of all the other medicine that he takes he just does not feel like taking anymore. Denies fever, wheezing. PMHx:  Past Medical History:   Diagnosis Date    Asperger's syndrome     Elevated blood pressure reading without diagnosis of hypertension 2/26/2010    Rash and other nonspecific skin eruption 2/26/2010    Unspecified asthma(493.90) 2/26/2010    Unspecified pruritic disorder 2/26/2010       Meds:   Current Outpatient Medications   Medication Sig Dispense Refill    sertraline (ZOLOFT) 25 mg tablet Take 150 mg by mouth. Pt takes 150mg once a day      albuterol (PROVENTIL HFA, VENTOLIN HFA, PROAIR HFA) 90 mcg/actuation inhaler Take 1 Puff by inhalation every four (4) hours as needed for Wheezing. 1 Inhaler 3    fluticasone (FLONASE) 50 mcg/actuation nasal spray TAKE 1 SPRAY IN EACH NOSTRIL DAILY 1 Bottle 5    montelukast (SINGULAIR) 10 mg tablet TAKE ONE TABLET   BY MOUTH   DAILY 90 Tab 3    ARIPiprazole (ABILIFY) 20 mg tablet Take 20 mg by mouth daily.  dexmethylphenidate (FOCALIN XR) 20 mg MP50 Take 3 Tabs by mouth every morning.  OLANZAPINE (ZYPREXA PO) Take 20 mg by mouth nightly. Allergies:    Allergies   Allergen Reactions    Penicillins Unknown (comments)    Resperal [C-Jdcsedbdwfxw-Nhddhatfv-Pe-Dm] Other (comments)     Nasal congestion and increased heart rate    Seroquel [Quetiapine] Unknown (comments)    Sulfa (Sulfonamide Antibiotics) Unknown (comments)       Smoker:  Social History     Tobacco Use   Smoking Status Never Smoker   Smokeless Tobacco Never Used       ETOH:   Social History     Substance and Sexual Activity   Alcohol Use No    Alcohol/week: 0.0 standard drinks       FH: No family history on file. ROS:   As listed in HPI. In addition:  Constitutional:   No headache, fever, weight loss or weight gain      Eyes:   No redness, pruritis, pain, visual changes, swelling, or discharge      Ears:    No pain, loss or changes in hearing     Cardiac:    No chest pain      Resp:   No shortness of breath or wheeze      Neuro   No loss of consciousness, dizziness, seizure    Physical Exam:  Blood pressure 127/80, pulse 94, temperature 98.3 °F (36.8 °C), temperature source Oral, resp. rate 14, height 5' 6\" (1.676 m), weight 204 lb (92.5 kg), SpO2 96 %. GEN: No apparent distress. EYES:  Conjunctiva clear  EAR: TM are clear and without effusion. NOSE: Turbinates are congested  OROPHYARYNX: No erythema or tonsilar exudates  NECK:  Submandibular LAD. Non tender         LUNGS: Respirations unlabored; clear to auscultation bilaterally. No wheeze  CARDIOVASCULAR: Regular, rate, and rhythm  ABDOMEN: Soft; nontender;nl BS  SKIN: No obvious rashes. Assessment and Plan     Seasonal allergic rhinitis  Antihistamines  Nasal steroids  Nasal saline can help  Local honey can help    Ear discomfort seems to be from sternocleidomastoid that is tight and tender on the right    Minimal LAD    RTC if ssx worsen or fail to improve as expected      ICD-10-CM ICD-9-CM    1. Allergic rhinitis, unspecified seasonality, unspecified trigger J30.9 477.9    2. Sore throat J02.9 462 AMB POC RAPID STREP A       AVS given.  Pt expressed understanding of instructions

## 2021-05-19 ENCOUNTER — OFFICE VISIT (OUTPATIENT)
Dept: FAMILY MEDICINE CLINIC | Age: 26
End: 2021-05-19
Payer: MEDICAID

## 2021-05-19 ENCOUNTER — TELEPHONE (OUTPATIENT)
Dept: FAMILY MEDICINE CLINIC | Age: 26
End: 2021-05-19

## 2021-05-19 VITALS
OXYGEN SATURATION: 95 % | RESPIRATION RATE: 14 BRPM | SYSTOLIC BLOOD PRESSURE: 121 MMHG | HEIGHT: 66 IN | BODY MASS INDEX: 37.93 KG/M2 | HEART RATE: 93 BPM | DIASTOLIC BLOOD PRESSURE: 77 MMHG | TEMPERATURE: 98.1 F | WEIGHT: 236 LBS

## 2021-05-19 DIAGNOSIS — L72.0: Primary | ICD-10-CM

## 2021-05-19 PROCEDURE — 99213 OFFICE O/P EST LOW 20 MIN: CPT | Performed by: FAMILY MEDICINE

## 2021-05-19 NOTE — TELEPHONE ENCOUNTER
Pt was not able to get an appt with any of the dermatologists we referred him to due to having medicaid. Mom was able to get an appt with [facility]. [facility] is requesting notes from today's appt. Pt has an appt with [facility] on July, 2, 2021.     Please fax notes to [phone]

## 2021-05-19 NOTE — PROGRESS NOTES
HPI  Ashanti Carmen is a 31 y.o. male who with a growth on his left eyebrow. He has had something there for years. Scratched at it a month or 2 ago and it came off. Then grew back as pictured below. No pain    PMHx:  Past Medical History:   Diagnosis Date    Asperger's syndrome     Elevated blood pressure reading without diagnosis of hypertension 2/26/2010    Rash and other nonspecific skin eruption 2/26/2010    Unspecified asthma(493.90) 2/26/2010    Unspecified pruritic disorder 2/26/2010       Meds:   Current Outpatient Medications   Medication Sig Dispense Refill    sertraline (ZOLOFT) 25 mg tablet Take 150 mg by mouth. Pt takes 150mg once a day      albuterol (PROVENTIL HFA, VENTOLIN HFA, PROAIR HFA) 90 mcg/actuation inhaler Take 1 Puff by inhalation every four (4) hours as needed for Wheezing. 1 Inhaler 3    fluticasone (FLONASE) 50 mcg/actuation nasal spray TAKE 1 SPRAY IN EACH NOSTRIL DAILY 1 Bottle 5    montelukast (SINGULAIR) 10 mg tablet TAKE ONE TABLET   BY MOUTH   DAILY 90 Tab 3    ARIPiprazole (ABILIFY) 20 mg tablet Take 20 mg by mouth daily.  dexmethylphenidate (FOCALIN XR) 20 mg MP50 Take 3 Tabs by mouth every morning.  OLANZAPINE (ZYPREXA PO) Take 20 mg by mouth nightly. Allergies: Allergies   Allergen Reactions    Penicillins Unknown (comments)    Resperal [G-Txlpcqvoxxem-Arxuqzcbo-Pe-Dm] Other (comments)     Nasal congestion and increased heart rate    Seroquel [Quetiapine] Unknown (comments)    Sulfa (Sulfonamide Antibiotics) Unknown (comments)       Smoker:  Social History     Tobacco Use   Smoking Status Never Smoker   Smokeless Tobacco Never Used       ETOH:   Social History     Substance and Sexual Activity   Alcohol Use No    Alcohol/week: 0.0 standard drinks       FH: No family history on file. ROS:   As listed in HPI.  In addition:  Constitutional:   No headache, fever, fatigue, weight loss or weight gain      Cardiac:    No chest pain      Resp: No cough or shortness of breath      Neuro   No loss of consciousness, dizziness, seizures      Physical Exam:  Blood pressure 121/77, pulse 93, temperature 98.1 °F (36.7 °C), temperature source Oral, resp. rate 14, height 5' 6\" (1.676 m), weight 236 lb (107 kg), SpO2 95 %. GEN: No apparent distress. Alert and oriented and responds to all questions appropriately. NEUROLOGIC:  No focal neurologic deficits. Strength and sensation grossly intact. Coordination and gait grossly intact. EXT: Well perfused. No edema. SKIN: No obvious rashes. Alexander Row cyst as pictured below has a pedunculated base with a horn of keratin tissue             Assessment and Plan     Cutaneous horn  Grew rapidly, cosmetic location  Suggest he be evaluated by dermatology for removal.  Provided referral      ICD-10-CM ICD-9-CM    1. 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  Pt expressed understanding of instructions

## 2021-05-19 NOTE — PROGRESS NOTES
Mortimer Moritz is a 31 y.o. male  Chief Complaint   Patient presents with   G[address] 7342 Maintenance Due   Topic Date Due    Hepatitis C Screening  Never done    COVID-19 Vaccine (1) Never done    DTaP/Tdap/Td series (2 - Td) 09/30/2018     Visit Vitals  Ht 5' 6\" (1.676 m)   Wt 236 lb (107 kg)   BMI 38.09 kg/m²     1. Have you been to the ER, urgent care clinic since your last visit? Hospitalized since your last visit? No    2. Have you seen or consulted any other health care providers outside of the [address] [place] [address] since your last visit? Include any pap smears or colon screening. Yes When: november/2020   . Christian Mauricio

## 2022-11-08 ENCOUNTER — OFFICE VISIT (OUTPATIENT)
Dept: FAMILY MEDICINE CLINIC | Age: 27
End: 2022-11-08
Payer: MEDICARE

## 2022-11-08 VITALS
RESPIRATION RATE: 16 BRPM | HEART RATE: 99 BPM | SYSTOLIC BLOOD PRESSURE: 120 MMHG | DIASTOLIC BLOOD PRESSURE: 77 MMHG | OXYGEN SATURATION: 95 % | WEIGHT: 235.6 LBS | BODY MASS INDEX: 37.86 KG/M2 | HEIGHT: 66 IN

## 2022-11-08 DIAGNOSIS — F84.5 ASPERGER'S SYNDROME: ICD-10-CM

## 2022-11-08 DIAGNOSIS — F98.8 ATTENTION DEFICIT DISORDER, UNSPECIFIED HYPERACTIVITY PRESENCE: ICD-10-CM

## 2022-11-08 DIAGNOSIS — Z00.00 ROUTINE GENERAL MEDICAL EXAMINATION AT A HEALTH CARE FACILITY: Primary | ICD-10-CM

## 2022-11-08 DIAGNOSIS — J45.20 MILD INTERMITTENT ASTHMA WITHOUT COMPLICATION: ICD-10-CM

## 2022-11-08 PROCEDURE — G0439 PPPS, SUBSEQ VISIT: HCPCS | Performed by: FAMILY MEDICINE

## 2022-11-08 PROCEDURE — G8419 CALC BMI OUT NRM PARAM NOF/U: HCPCS | Performed by: FAMILY MEDICINE

## 2022-11-08 PROCEDURE — G8427 DOCREV CUR MEDS BY ELIG CLIN: HCPCS | Performed by: FAMILY MEDICINE

## 2022-11-08 PROCEDURE — G8432 DEP SCR NOT DOC, RNG: HCPCS | Performed by: FAMILY MEDICINE

## 2022-11-08 NOTE — PROGRESS NOTES
Medicare Annual Wellness Visit     I have reviewed the patient's medical history in detail and updated the computerized patient record. History   Brody Andrade is a 28 y.o. male who presents to follow-up on chronic medical issues. Dressed today in a Berna hat and a Yudy shirt. Looks forward to [employer] in July. Provides his own history. Mother accompanies him to assist.    He has no complaints. He is following with urology for kidney stones. Has oxalate stones and follows a low oxalate diet. He does pay attention to what he eats for this reason but sounds like he is sticking to some less than healthy choices    Past Medical History:   Diagnosis Date    Asperger's syndrome     Elevated blood pressure reading without diagnosis of hypertension 2/26/2010    Rash and other nonspecific skin eruption 2/26/2010    Unspecified asthma(493.90) 2/26/2010    Unspecified pruritic disorder 2/26/2010      Past Surgical History:   Procedure Laterality Date    COLONOSCOPY N/A 8/27/2019    COLONOSCOPY performed by Teresita Butler MD at [facility] ENDOSCOPY    HX TYMPANOSTOMY      1998     Current Outpatient Medications   Medication Sig Dispense Refill    sertraline (ZOLOFT) 25 mg tablet Take 150 mg by mouth. Pt takes 150mg once a day      albuterol (PROVENTIL HFA, VENTOLIN HFA, PROAIR HFA) 90 mcg/actuation inhaler Take 1 Puff by inhalation every four (4) hours as needed for Wheezing. 1 Inhaler 3    montelukast (SINGULAIR) 10 mg tablet TAKE ONE TABLET   BY MOUTH   DAILY 90 Tab 3    ARIPiprazole (ABILIFY) 20 mg tablet Take 20 mg by mouth daily. dexmethylphenidate (FOCALIN XR) 20 mg ER capsule Take 3 Tabs by mouth every morning. OLANZAPINE (ZYPREXA PO) Take 20 mg by mouth nightly.       fluticasone (FLONASE) 50 mcg/actuation nasal spray TAKE 1 SPRAY IN EACH NOSTRIL DAILY (Patient not taking: Reported on 11/8/2022) 1 Bottle 5     Allergies   Allergen Reactions    Penicillins Unknown (comments)    Resperal [B-Bxzjmeqqojcg-Qpqpbbccy-Pe-Dm] Other (comments)     Nasal congestion and increased heart rate    Seroquel [Quetiapine] Unknown (comments)    Sulfa (Sulfonamide Antibiotics) Unknown (comments)     History reviewed. No pertinent family history. Social History     Tobacco Use    Smoking status: Never    Smokeless tobacco: Never   Substance Use Topics    Alcohol use: Yes     Comment: occasionally     Patient Active Problem List   Diagnosis Code    Asthma J45.909    Attention deficit disorder F98.8    Asperger's syndrome F84.5       Depression Risk Factor Screening:     3 most recent PHQ Screens 11/8/2022   PHQ Not Done -   Little interest or pleasure in doing things Not at all   Feeling down, depressed, irritable, or hopeless Not at all   Total Score PHQ 2 0     Alcohol Risk Factor Screening: On any occasion during the past 3 months, have you had more than 4 drinks containing alcohol?  no    Do you average more than 14 drinks per week?  no      Functional Ability and Level of Safety:     Hearing Loss   none    Activities of Daily Living   Self-care. Requires assistance with: no ADLs    Fall Risk   No flowsheet data found. Abuse Screen   Patient is not abused    Review of Systems   ROS:  As listed in HPI. In addition:  Constitutional:   No headache, fever, fatigue, weight loss or weight gain      Eyes:   No redness, pruritis, pain, visual changes, swelling, or discharge      Ears:    No pain, loss or changes in hearing     Cardiac:    No chest pain      Resp:   No cough or shortness of breath      Neuro   No loss of consciousness, dizziness, seizure    Physical Examination     Evaluation of Cognitive Function:  Mood/affect:  happy  Appearance: age appropriate  Family member/caregiver input:     Physical Exam:  Blood pressure 120/77, pulse 99, resp. rate 16, height 5' 6\" (1.676 m), weight 235 lb 9.6 oz (106.9 kg), SpO2 95 %. GEN: No apparent distress.  Alert and oriented and responds to all questions appropriately. EYES:  Conjunctiva clear; pupils round and reactive to light; extraocular movements are intact. EAR: External ears are normal.  Tympanic membranes are clear and without effusion. NECK:  Supple; no masses; thyroid normal           LUNGS: Respirations unlabored; clear to auscultation bilaterally  CARDIOVASCULAR: Regular, rate, and rhythm without murmurs   ABDOMEN: Soft; nontender; nondistended; normoactive bowel sounds; no masses or organomegaly  NEUROLOGIC:  No focal neurologic deficits. Strength and sensation grossly intact. Coordination and gait grossly intact. EXT: Well perfused. No edema. SKIN: No obvious rashes. Patient Care Team:  Jean Pierre Lamas MD as PCP - General (Family Medicine)  Jean Pierre Lamas MD as PCP - [facility]  Finis Phalen, MD (Psychologist - Child/Adolescent)  Clarita Snellen, MD (Allergy)    Advice/Referrals/Counseling   Education and counseling provided:    Got his flu shot and COVID bivalent booster    Assessment/Plan     He is well. He is due for every 3 year surveillance labs    Discussed diet, weight loss, exercise. Asperger's  ADHD  Care of psychiatry    Kidney stones  Care of urology  Oxalate    Asthma  Well-controlled  Care of asthma  Had COVID a month ago and did fine from a breathing standpoint which he was pleasantly surprised by      ICD-10-CM ICD-9-CM    1. Routine general medical examination at a health care facility  Z00.00 V70.0       2. Asperger's syndrome  F84.5 299.80 CBC WITH AUTOMATED DIFF      LIPID PANEL      METABOLIC PANEL, COMPREHENSIVE      CBC WITH AUTOMATED DIFF      LIPID PANEL      METABOLIC PANEL, COMPREHENSIVE      3. Attention deficit disorder, unspecified hyperactivity presence  F98.8 314.00 CBC WITH AUTOMATED DIFF      LIPID PANEL      METABOLIC PANEL, COMPREHENSIVE      CBC WITH AUTOMATED DIFF      LIPID PANEL      METABOLIC PANEL, COMPREHENSIVE      4.  Mild intermittent asthma without complication  E10.11 493.90       5.  BMI 38.0-38.9,adult  Z68.38 V85.38

## 2022-11-08 NOTE — PROGRESS NOTES
Chief Complaint   Patient presents with    Physical         Health Maintenance Due   Topic Date Due    Hepatitis C Screening  Never done    Depression Screen  Never done    DTaP/Tdap/Td series (2 - Td or Tdap) 09/30/2018    COVID-19 Vaccine (2 - Moderna series) 12/30/2021    Flu Vaccine (1) 08/01/2022     1. \"Have you been to the ER, urgent care clinic since your last visit? Hospitalized since your last visit? \" No    2. \"Have you seen or consulted any other health care providers outside of the [address] [place] [address] since your last visit? \"  Yes, [facility] (biopsy was done)      3. For patients aged 39-70: Has the patient had a colonoscopy / FIT/ Cologuard? NA - based on age      If the patient is female:    4. For patients aged 41-77: Has the patient had a mammogram within the past 2 years? NA - based on age or sex      11. For patients aged 21-65: Has the patient had a pap smear?  NA - based on age or sex

## 2022-11-09 LAB
ALBUMIN SERPL-MCNC: 5.4 G/DL (ref 4.1–5.2)
ALBUMIN/GLOB SERPL: 2.6 {RATIO} (ref 1.2–2.2)
ALP SERPL-CCNC: 83 IU/L (ref 44–121)
ALT SERPL-CCNC: 53 IU/L (ref 0–44)
AST SERPL-CCNC: 26 IU/L (ref 0–40)
BASOPHILS # BLD AUTO: 0 X10E3/UL (ref 0–0.2)
BASOPHILS NFR BLD AUTO: 1 %
BILIRUB SERPL-MCNC: 0.4 MG/DL (ref 0–1.2)
BUN SERPL-MCNC: 13 MG/DL (ref 6–20)
BUN/CREAT SERPL: 12 (ref 9–20)
CALCIUM SERPL-MCNC: 10.1 MG/DL (ref 8.7–10.2)
CHLORIDE SERPL-SCNC: 105 MMOL/L (ref 96–106)
CHOLEST SERPL-MCNC: 192 MG/DL (ref 100–199)
CO2 SERPL-SCNC: 23 MMOL/L (ref 20–29)
CREAT SERPL-MCNC: 1.12 MG/DL (ref 0.76–1.27)
EGFR: 92 ML/MIN/1.73
EOSINOPHIL # BLD AUTO: 0 X10E3/UL (ref 0–0.4)
EOSINOPHIL NFR BLD AUTO: 0 %
ERYTHROCYTE [DISTWIDTH] IN BLOOD BY AUTOMATED COUNT: 12.8 % (ref 11.6–15.4)
GLOBULIN SER CALC-MCNC: 2.1 G/DL (ref 1.5–4.5)
GLUCOSE SERPL-MCNC: 91 MG/DL (ref 70–99)
HCT VFR BLD AUTO: 51.7 % (ref 37.5–51)
HDLC SERPL-MCNC: 26 MG/DL
HGB BLD-MCNC: 17.7 G/DL (ref 13–17.7)
IMM GRANULOCYTES # BLD AUTO: 0 X10E3/UL (ref 0–0.1)
IMM GRANULOCYTES NFR BLD AUTO: 1 %
IMP & REVIEW OF LAB RESULTS: NORMAL
LDLC SERPL CALC-MCNC: 98 MG/DL (ref 0–99)
LYMPHOCYTES # BLD AUTO: 2.3 X10E3/UL (ref 0.7–3.1)
LYMPHOCYTES NFR BLD AUTO: 29 %
MCH RBC QN AUTO: 31.9 PG (ref 26.6–33)
MCHC RBC AUTO-ENTMCNC: 34.2 G/DL (ref 31.5–35.7)
MCV RBC AUTO: 93 FL (ref 79–97)
MONOCYTES # BLD AUTO: 0.5 X10E3/UL (ref 0.1–0.9)
MONOCYTES NFR BLD AUTO: 6 %
NEUTROPHILS # BLD AUTO: 5.1 X10E3/UL (ref 1.4–7)
NEUTROPHILS NFR BLD AUTO: 63 %
PLATELET # BLD AUTO: 202 X10E3/UL (ref 150–450)
POTASSIUM SERPL-SCNC: 5.2 MMOL/L (ref 3.5–5.2)
PROT SERPL-MCNC: 7.5 G/DL (ref 6–8.5)
RBC # BLD AUTO: 5.55 X10E6/UL (ref 4.14–5.8)
SODIUM SERPL-SCNC: 145 MMOL/L (ref 134–144)
TRIGL SERPL-MCNC: 402 MG/DL (ref 0–149)
VLDLC SERPL CALC-MCNC: 68 MG/DL (ref 5–40)
WBC # BLD AUTO: 8 X10E3/UL (ref 3.4–10.8)

## 2022-12-12 ENCOUNTER — TRANSCRIBE ORDER (OUTPATIENT)
Dept: SCHEDULING | Age: 27
End: 2022-12-12

## 2024-07-05 ENCOUNTER — OFFICE VISIT (OUTPATIENT)
Age: 29
End: 2024-07-05
Payer: MEDICARE

## 2024-07-05 VITALS
BODY MASS INDEX: 37.32 KG/M2 | HEART RATE: 103 BPM | SYSTOLIC BLOOD PRESSURE: 126 MMHG | HEIGHT: 67 IN | TEMPERATURE: 97.7 F | RESPIRATION RATE: 17 BRPM | OXYGEN SATURATION: 94 % | DIASTOLIC BLOOD PRESSURE: 74 MMHG | WEIGHT: 237.8 LBS

## 2024-07-05 DIAGNOSIS — R53.83 OTHER FATIGUE: ICD-10-CM

## 2024-07-05 DIAGNOSIS — F84.5 ASPERGER'S SYNDROME: ICD-10-CM

## 2024-07-05 DIAGNOSIS — Z00.00 ROUTINE GENERAL MEDICAL EXAMINATION AT A HEALTH CARE FACILITY: Primary | ICD-10-CM

## 2024-07-05 DIAGNOSIS — J45.20 MILD INTERMITTENT ASTHMA, UNCOMPLICATED: ICD-10-CM

## 2024-07-05 DIAGNOSIS — E78.5 HYPERLIPIDEMIA, UNSPECIFIED HYPERLIPIDEMIA TYPE: ICD-10-CM

## 2024-07-05 DIAGNOSIS — E55.9 VITAMIN D DEFICIENCY: ICD-10-CM

## 2024-07-05 DIAGNOSIS — F51.3 SLEEPWALKING: ICD-10-CM

## 2024-07-05 DIAGNOSIS — R06.81 WITNESSED EPISODE OF APNEA: ICD-10-CM

## 2024-07-05 DIAGNOSIS — J30.1 ALLERGIC RHINITIS DUE TO POLLEN, UNSPECIFIED SEASONALITY: ICD-10-CM

## 2024-07-05 DIAGNOSIS — E53.8 B12 DEFICIENCY: ICD-10-CM

## 2024-07-05 PROCEDURE — G0439 PPPS, SUBSEQ VISIT: HCPCS | Performed by: FAMILY MEDICINE

## 2024-07-05 RX ORDER — DEXMETHYLPHENIDATE HYDROCHLORIDE 40 MG/1
40 CAPSULE, EXTENDED RELEASE ORAL DAILY
COMMUNITY

## 2024-07-05 RX ORDER — DEXMETHYLPHENIDATE HYDROCHLORIDE 10 MG/1
20 TABLET ORAL EVERY EVENING
COMMUNITY
Start: 2024-07-03

## 2024-07-05 RX ORDER — SERTRALINE HYDROCHLORIDE 100 MG/1
150 TABLET, FILM COATED ORAL DAILY
COMMUNITY
Start: 2024-07-03

## 2024-07-05 RX ORDER — OLANZAPINE 10 MG/1
2 TABLET ORAL DAILY
COMMUNITY
Start: 2021-07-02

## 2024-07-05 SDOH — ECONOMIC STABILITY: FOOD INSECURITY: WITHIN THE PAST 12 MONTHS, THE FOOD YOU BOUGHT JUST DIDN'T LAST AND YOU DIDN'T HAVE MONEY TO GET MORE.: NEVER TRUE

## 2024-07-05 SDOH — ECONOMIC STABILITY: INCOME INSECURITY: HOW HARD IS IT FOR YOU TO PAY FOR THE VERY BASICS LIKE FOOD, HOUSING, MEDICAL CARE, AND HEATING?: NOT HARD AT ALL

## 2024-07-05 SDOH — ECONOMIC STABILITY: FOOD INSECURITY: WITHIN THE PAST 12 MONTHS, YOU WORRIED THAT YOUR FOOD WOULD RUN OUT BEFORE YOU GOT MONEY TO BUY MORE.: NEVER TRUE

## 2024-07-05 SDOH — ECONOMIC STABILITY: HOUSING INSECURITY
IN THE LAST 12 MONTHS, WAS THERE A TIME WHEN YOU DID NOT HAVE A STEADY PLACE TO SLEEP OR SLEPT IN A SHELTER (INCLUDING NOW)?: NO

## 2024-07-05 ASSESSMENT — PATIENT HEALTH QUESTIONNAIRE - PHQ9
SUM OF ALL RESPONSES TO PHQ QUESTIONS 1-9: 0
2. FEELING DOWN, DEPRESSED OR HOPELESS: NOT AT ALL
SUM OF ALL RESPONSES TO PHQ QUESTIONS 1-9: 0
SUM OF ALL RESPONSES TO PHQ9 QUESTIONS 1 & 2: 0
1. LITTLE INTEREST OR PLEASURE IN DOING THINGS: NOT AT ALL
SUM OF ALL RESPONSES TO PHQ QUESTIONS 1-9: 0
SUM OF ALL RESPONSES TO PHQ QUESTIONS 1-9: 0

## 2024-07-05 NOTE — PROGRESS NOTES
Medicare Annual Wellness Visit     I have reviewed the patient's medical history in detail and updated the computerized patient record.     History   Hardy James is a 28 y.o. male who presents to follow-up on chronic medical issues.    Carries a diagnosis of Asperger's syndrome.  Provides his own history.  Mother accompanies him to assist.    Has a concern about the sleepwalking.  This is not a typical problem for him.  However he has had 2 possibly 3 episodes in the last month where he went to bed as he usually does in his bed and then woke up on the couch or in a chair in the living room.  On 1 occasion he was hugging a bag of bagels and a bucket of popcorn chicken while lying on the couch.    In retrospect he thinks he might have been a little anxious the day before.  There have been no changes in his medications although he does sometimes forget to take his evening medications because he is so tired.  These evening medications would include Abilify Zyprexa, Zoloft.    He has \"snored myself awake\" on a couple occasions    Past Medical History:   Diagnosis Date    ADHD (attention deficit hyperactivity disorder)     Anxiety     Asperger's syndrome     Asthma     Elevated blood pressure reading without diagnosis of hypertension 2/26/2010    Rash and other nonspecific skin eruption 2/26/2010    Unspecified asthma(493.90) 2/26/2010    Unspecified pruritic disorder 2/26/2010      Past Surgical History:   Procedure Laterality Date    COLONOSCOPY N/A 8/27/2019    COLONOSCOPY performed by Duke Mabry MD at [facility] ENDOSCOPY    TYMPANOSTOMY TUBE PLACEMENT      1998     Current Outpatient Medications   Medication Sig Dispense Refill    Dexmethylphenidate HCl ER 40 MG CP24 Take 40 mg by mouth Daily. Max Daily Amount: 40 mg      dexmethylphenidate (FOCALIN) 10 MG tablet Take 2 tablets by mouth every evening.      sertraline (ZOLOFT) 100 MG tablet Take 1.5 tablets by mouth daily      OLANZapine (ZYPREXA) 10 MG tablet Take 2

## 2024-07-05 NOTE — PROGRESS NOTES
Chief Complaint   Patient presents with    Annual Exam         Health Maintenance Due   Topic Date Due    Hepatitis B vaccine (1 of 3 - 3-dose series) Never done    Varicella vaccine (1 of 2 - 2-dose childhood series) Never done    Pneumococcal 0-64 years Vaccine (1 of 2 - PCV) Never done    HIV screen  Never done    Hepatitis C screen  Never done    DTaP/Tdap/Td vaccine (2 - Td or Tdap) 09/30/2018    Depression Screen  11/08/2023    COVID-19 Vaccine (3 - 2023-24 season) 12/21/2023    Annual Wellness Visit (Medicare Advantage)  Never done         \"Have you been to the ER, urgent care clinic since your last visit?  Hospitalized since your last visit?\"    NO    “Have you seen or consulted any other health care providers outside of [facility] since your last visit?”    YES - When: approximately Yes ago.  Where and Why: Urology, Psychiatrist, Dermatology.

## 2024-07-10 ENCOUNTER — TELEPHONE (OUTPATIENT)
Age: 29
End: 2024-07-10

## 2024-07-10 LAB
25(OH)D3+25(OH)D2 SERPL-MCNC: 35.7 NG/ML (ref 30–100)
ALBUMIN SERPL-MCNC: 5 G/DL (ref 4.3–5.2)
ALP SERPL-CCNC: 66 IU/L (ref 44–121)
ALT SERPL-CCNC: 56 IU/L (ref 0–44)
AST SERPL-CCNC: 33 IU/L (ref 0–40)
BASOPHILS # BLD AUTO: 0 X10E3/UL (ref 0–0.2)
BASOPHILS NFR BLD AUTO: 0 %
BILIRUB SERPL-MCNC: 0.5 MG/DL (ref 0–1.2)
BUN SERPL-MCNC: 9 MG/DL (ref 6–20)
BUN/CREAT SERPL: 9 (ref 9–20)
CALCIUM SERPL-MCNC: 9.7 MG/DL (ref 8.7–10.2)
CHLORIDE SERPL-SCNC: 105 MMOL/L (ref 96–106)
CHOLEST SERPL-MCNC: 182 MG/DL (ref 100–199)
CO2 SERPL-SCNC: 22 MMOL/L (ref 20–29)
CREAT SERPL-MCNC: 1.03 MG/DL (ref 0.76–1.27)
EGFRCR SERPLBLD CKD-EPI 2021: 101 ML/MIN/1.73
EOSINOPHIL # BLD AUTO: 0.1 X10E3/UL (ref 0–0.4)
EOSINOPHIL NFR BLD AUTO: 2 %
ERYTHROCYTE [DISTWIDTH] IN BLOOD BY AUTOMATED COUNT: 12.6 % (ref 11.6–15.4)
FOLATE SERPL-MCNC: 5.8 NG/ML
GLOBULIN SER CALC-MCNC: 2.1 G/DL (ref 1.5–4.5)
GLUCOSE SERPL-MCNC: 122 MG/DL (ref 70–99)
HCT VFR BLD AUTO: 49.6 % (ref 37.5–51)
HDLC SERPL-MCNC: 25 MG/DL
HGB BLD-MCNC: 17 G/DL (ref 13–17.7)
IMM GRANULOCYTES # BLD AUTO: 0 X10E3/UL (ref 0–0.1)
IMM GRANULOCYTES NFR BLD AUTO: 0 %
IMP & REVIEW OF LAB RESULTS: NORMAL
LDLC SERPL CALC-MCNC: 97 MG/DL (ref 0–99)
LYMPHOCYTES # BLD AUTO: 2 X10E3/UL (ref 0.7–3.1)
LYMPHOCYTES NFR BLD AUTO: 28 %
MCH RBC QN AUTO: 31.8 PG (ref 26.6–33)
MCHC RBC AUTO-ENTMCNC: 34.3 G/DL (ref 31.5–35.7)
MCV RBC AUTO: 93 FL (ref 79–97)
MONOCYTES # BLD AUTO: 0.3 X10E3/UL (ref 0.1–0.9)
MONOCYTES NFR BLD AUTO: 4 %
NEUTROPHILS # BLD AUTO: 4.7 X10E3/UL (ref 1.4–7)
NEUTROPHILS NFR BLD AUTO: 66 %
PLATELET # BLD AUTO: 182 X10E3/UL (ref 150–450)
POTASSIUM SERPL-SCNC: 3.8 MMOL/L (ref 3.5–5.2)
PROT SERPL-MCNC: 7.1 G/DL (ref 6–8.5)
RBC # BLD AUTO: 5.35 X10E6/UL (ref 4.14–5.8)
SODIUM SERPL-SCNC: 142 MMOL/L (ref 134–144)
TRIGL SERPL-MCNC: 354 MG/DL (ref 0–149)
TSH SERPL DL<=0.005 MIU/L-ACNC: 0.92 UIU/ML (ref 0.45–4.5)
VIT B12 SERPL-MCNC: 389 PG/ML (ref 232–1245)
VLDLC SERPL CALC-MCNC: 60 MG/DL (ref 5–40)
WBC # BLD AUTO: 7.1 X10E3/UL (ref 3.4–10.8)

## 2024-07-10 NOTE — TELEPHONE ENCOUNTER
Called lab. Pt went to [facility] draw site yesterday. Wasn't drawn here.  All other labs are still pending since it was just done yesterday per [facility] lab person.

## 2024-10-14 ASSESSMENT — SLEEP AND FATIGUE QUESTIONNAIRES
DO YOU HAVE DIFFICULTY OPERATING A MOTOR VEHICLE FOR SHORT DISTANCES (LESS THAN 100 MILES) BECAUSE YOU BECOME SLEEPY: NO
DO YOU HAVE DIFFICULTY BEING AS ACTIVE AS YOU WANT TO BE IN THE EVENING BECAUSE YOU ARE SLEEPY OR TIRED: NO
HAS YOUR RELATIONSHIP WITH FAMILY, FRIENDS OR WORK COLLEAGUES BEEN AFFECTED BECAUSE YOU ARE SLEEPY OR TIRED: NO
HAS YOUR MOOD BEEN AFFECTED BECAUSE YOU ARE SLEEPY OR TIRED: YES, MODERATE
HOW MANY NAPS DO YOU TAKE PER WEEK: 1
HOW LIKELY ARE YOU TO NOD OFF OR FALL ASLEEP WHEN YOU ARE A PASSENGER IN A CAR FOR AN HOUR WITHOUT A BREAK: WOULD NEVER DOZE
NUMBER OF TIMES YOU WAKE PER NIGHT: 3
HOW LIKELY ARE YOU TO NOD OFF OR FALL ASLEEP WHILE LYING DOWN TO REST IN THE AFTERNOON WHEN CIRCUMSTANCES PERMIT: SLIGHT CHANCE OF DOZING
HOW LIKELY ARE YOU TO NOD OFF OR FALL ASLEEP WHILE SITTING AND READING: WOULD NEVER DOZE
DO YOU HAVE DIFFICULTY BEING AS ACTIVE AS YOU WANT TO BE IN THE MORNING BECAUSE YOU ARE SLEEPY OR TIRED: NO
FOSQ SCORE: 19
DO YOU HAVE DIFFICULTY CONCENTRATING ON THE THINGS YOU DO BECAUSE YOU ARE SLEEPY OR TIRED: NO
SELECT ANY OF THE FOLLOWING BEHAVIORS OBSERVED WHILE YOU ARE ASLEEP: SLEEPWALKING
ARE YOU BOTHERED BY WAKING UP TOO EARLY AND NOT BEING ABLE TO GET BACK TO SLEEP: NO
HOW LIKELY ARE YOU TO NOD OFF OR FALL ASLEEP WHILE SITTING QUIETLY AFTER LUNCH WITHOUT ALCOHOL: SLIGHT CHANCE OF DOZING
DO YOU HAVE PROBLEMS WITH FREQUENT AWAKENINGS AT NIGHT: YES
HOW LIKELY ARE YOU TO NOD OFF OR FALL ASLEEP WHILE WATCHING TV: SLIGHT CHANCE OF DOZING
HOW LIKELY ARE YOU TO NOD OFF OR FALL ASLEEP WHILE SITTING INACTIVE IN A PUBLIC PLACE: WOULD NEVER DOZE
HOW LIKELY ARE YOU TO NOD OFF OR FALL ASLEEP WHILE LYING DOWN TO REST IN THE AFTERNOON WHEN CIRCUMSTANCES PERMIT: SLIGHT CHANCE OF DOZING
DO YOU GENERALLY HAVE DIFFICULTY REMEMBERING THINGS BECAUSE YOU ARE SLEEPY OR TIRED: NO
HOW LIKELY ARE YOU TO NOD OFF OR FALL ASLEEP IN A CAR, WHILE STOPPED FOR A FEW MINUTES IN TRAFFIC: WOULD NEVER DOZE
HOW LIKELY ARE YOU TO NOD OFF OR FALL ASLEEP WHILE WATCHING TV: SLIGHT CHANCE OF DOZING
WHAT TIME DO YOU USUALLY GO TO BED: 09:30
DO YOU HAVE DIFFICULTY WATCHING A MOVIE OR VIDEO BECAUSE YOU BECOME SLEEPY OR TIRED: NO
DO YOU WORK SHIFTS: NO
DO YOU HAVE DIFFICULTY OPERATING A MOTOR VEHICLE FOR LONG DISTANCES (GREATER THAN 100 MILES) BECAUSE YOU BECOME SLEEPY: NO
HOW LONG DO YOU NAP: 30 TO 45 MINUTES
HOW LIKELY ARE YOU TO NOD OFF OR FALL ASLEEP WHILE SITTING QUIETLY AFTER LUNCH WITHOUT ALCOHOL: SLIGHT CHANCE OF DOZING
DO YOU HAVE DIFFICULTY VISITING YOUR FAMILY OR FRIENDS IN THEIR HOME BECAUSE YOU BECOME SLEEPY OR TIRED: NO
HOW LIKELY ARE YOU TO NOD OFF OR FALL ASLEEP WHILE SITTING AND TALKING TO SOMEONE: WOULD NEVER DOZE
AVERAGE NUMBER OF SLEEP HOURS: 7
DO YOU GET TOO LITTLE SLEEP AT NIGHT: YES
HOW LIKELY ARE YOU TO NOD OFF OR FALL ASLEEP WHEN YOU ARE A PASSENGER IN A CAR FOR AN HOUR WITHOUT A BREAK: WOULD NEVER DOZE
HOW LIKELY ARE YOU TO NOD OFF OR FALL ASLEEP WHILE SITTING INACTIVE IN A PUBLIC PLACE: WOULD NEVER DOZE
ARE YOU BOTHERED BY WAKING UP TOO EARLY AND NOT BEING ABLE TO GET BACK TO SLEEP: NO
DO YOU GET TOO LITTLE SLEEP AT NIGHT: YES
AVERAGE NUMBER OF SLEEP HOURS: 7
SELECT ANY OF THE FOLLOWING BEHAVIORS OBSERVED WHILE YOU ARE ASLEEP: SLEEP TALKING
SELECT ANY OF THE FOLLOWING BEHAVIORS OBSERVED WHILE YOU ARE ASLEEP: LIGHT SNORING
DO YOU TAKE NAPS: YES
HOW LIKELY ARE YOU TO NOD OFF OR FALL ASLEEP WHILE SITTING AND TALKING TO SOMEONE: WOULD NEVER DOZE
SELECT ANY OF THE FOLLOWING BEHAVIORS OBSERVED WHILE YOU ARE ASLEEP: GETTING OUT OF THE BED WHILE STILL ASLEEP
ESS TOTAL SCORE: 3
HOW LIKELY ARE YOU TO NOD OFF OR FALL ASLEEP WHILE SITTING AND READING: WOULD NEVER DOZE

## 2024-10-16 ENCOUNTER — OFFICE VISIT (OUTPATIENT)
Age: 29
End: 2024-10-16
Payer: MEDICARE

## 2024-10-16 VITALS
SYSTOLIC BLOOD PRESSURE: 134 MMHG | HEIGHT: 66 IN | WEIGHT: 237 LBS | OXYGEN SATURATION: 93 % | BODY MASS INDEX: 38.09 KG/M2 | TEMPERATURE: 98 F | DIASTOLIC BLOOD PRESSURE: 64 MMHG | HEART RATE: 103 BPM

## 2024-10-16 DIAGNOSIS — G47.33 OSA (OBSTRUCTIVE SLEEP APNEA): Primary | ICD-10-CM

## 2024-10-16 DIAGNOSIS — G47.50 PARASOMNIA, UNSPECIFIED TYPE: ICD-10-CM

## 2024-10-16 DIAGNOSIS — Z86.59 H/O ANXIETY DISORDER: ICD-10-CM

## 2024-10-16 PROCEDURE — 99204 OFFICE O/P NEW MOD 45 MIN: CPT | Performed by: INTERNAL MEDICINE

## 2024-10-16 NOTE — PATIENT INSTRUCTIONS
be needed to remove enlarged tissues in the throat.  Follow-up care is a key part of your treatment and safety. Be sure to make and go to all appointments, and call your doctor if you are having problems. It's also a good idea to know your test results and keep a list of the medicines you take.  How can you care for yourself at home?  Lose weight, if needed. It may reduce the number of times you stop breathing or have slowed breathing.  Go to bed at the same time every night.  Sleep on your side. It may stop mild apnea. If you tend to roll onto your back, sew a pocket in the back of your [employer] top. Put a tennis ball into the pocket, and stitch the pocket shut. This will help keep you from sleeping on your back.  Avoid alcohol and medicines such as sleeping pills and sedatives before bed.  Do not smoke. Smoking can make sleep apnea worse. If you need help quitting, talk to your doctor about stop-smoking programs and medicines. These can increase your chances of quitting for good.  Prop up the head of your bed 4 to 6 inches by putting bricks under the legs of the bed.  Treat breathing problems, such as a stuffy nose, caused by a cold or allergies.  Use a continuous positive airway pressure (CPAP) breathing machine if lifestyle changes do not help your apnea and your doctor recommends it. The machine keeps your airway from closing when you sleep.  If CPAP does not help you, ask your doctor whether you should try other breathing machines. A bilevel positive airway pressure machine has two types of air pressureâ€”one for breathing in and one for breathing out. Another device raises or lowers air pressure as needed while you breathe.  If your nose feels dry or bleeds when using one of these machines, talk with your doctor about increasing moisture in the air. A humidifier may help.  If your nose is runny or stuffy from using a breathing machine, talk with your doctor about using decongestants or a corticosteroid nasal

## 2024-10-16 NOTE — PROGRESS NOTES
[address] [place] [address]  Tel.  [phone]    Fax. [phone]     [address] [place], [place] [address]  Tel.  [phone]    Fax. [phone] [address] [place] [address]  Tel.  [phone]    Fax. [phone]       Hardy James is a 29 y.o. year old male referred by Dr. Royal Powell  for evaluation of a sleep disorder.       ASSESSMENT/PLAN:     Diagnosis Orders   1. DANNIE (obstructive sleep apnea)  SLEEP STUDY FULL      2. Parasomnia, unspecified type        3. H/O anxiety disorder        4. BMI 38.0-38.9,adult            Patient has a history and examination consistent with the diagnosis of sleep apnea.    Return for follow-up after testing is completed.    * The patient currently has a Moderate Risk for having sleep apnea.  STOP-BANG score 4.    * Sleep testing was ordered for initial evaluation.      Orders Placed This Encounter   Procedures    SLEEP STUDY FULL     Standing Status:   Future     Standing Expiration Date:   10/16/2025       * He was provided information on sleep apnea including corresponding risk factors and the importance of proper treatment.     * Treatment options were reviewed in detail. he would like to proceed with PAP therapy. Patient will be seen in follow-up in 6-8 weeks after PAP setup to gauge treatment response and adherence to therapy.     * The patient was counseled regarding proper sleep hygiene, with emphasis on ensuring sufficient total sleep time; safe driving and the benefits of exercise and weight loss.      * All of his questions were addressed.    2. Parasomnia, unspecified type -sleep environment safety discussed, may be related to obstructive sleep apnea and treating sleep apnea may help resolve this problem.    3. H/O anxiety disorder - continue on current regimen, he will continue to monitor his mood and follow up with his care

## 2024-10-21 ENCOUNTER — HOSPITAL ENCOUNTER (OUTPATIENT)
Facility: HOSPITAL | Age: 29
Discharge: HOME | End: 2024-10-24
Payer: MEDICARE

## 2024-10-21 VITALS
BODY MASS INDEX: 38.09 KG/M2 | HEIGHT: 66 IN | TEMPERATURE: 98.3 F | SYSTOLIC BLOOD PRESSURE: 129 MMHG | OXYGEN SATURATION: 92 % | HEART RATE: 105 BPM | WEIGHT: 237 LBS | DIASTOLIC BLOOD PRESSURE: 72 MMHG

## 2024-10-21 DIAGNOSIS — G47.33 OSA (OBSTRUCTIVE SLEEP APNEA): ICD-10-CM

## 2024-10-21 PROCEDURE — 95810 POLYSOM 6/> YRS 4/> PARAM: CPT | Performed by: INTERNAL MEDICINE

## 2024-10-27 ENCOUNTER — CLINICAL DOCUMENTATION (OUTPATIENT)
Age: 29
End: 2024-10-27

## 2024-10-27 DIAGNOSIS — G47.33 OSA (OBSTRUCTIVE SLEEP APNEA): Primary | ICD-10-CM

## 2024-10-31 ENCOUNTER — TELEPHONE (OUTPATIENT)
Age: 29
End: 2024-10-31

## 2025-08-24 SDOH — HEALTH STABILITY: PHYSICAL HEALTH: ON AVERAGE, HOW MANY DAYS PER WEEK DO YOU ENGAGE IN MODERATE TO STRENUOUS EXERCISE (LIKE A BRISK WALK)?: 0 DAYS

## 2025-08-24 SDOH — ECONOMIC STABILITY: FOOD INSECURITY: WITHIN THE PAST 12 MONTHS, THE FOOD YOU BOUGHT JUST DIDN'T LAST AND YOU DIDN'T HAVE MONEY TO GET MORE.: NEVER TRUE

## 2025-08-24 SDOH — ECONOMIC STABILITY: TRANSPORTATION INSECURITY
IN THE PAST 12 MONTHS, HAS THE LACK OF TRANSPORTATION KEPT YOU FROM MEDICAL APPOINTMENTS OR FROM GETTING MEDICATIONS?: NO

## 2025-08-24 SDOH — HEALTH STABILITY: PHYSICAL HEALTH: ON AVERAGE, HOW MANY MINUTES DO YOU ENGAGE IN EXERCISE AT THIS LEVEL?: 0 MIN

## 2025-08-24 SDOH — ECONOMIC STABILITY: FOOD INSECURITY: WITHIN THE PAST 12 MONTHS, YOU WORRIED THAT YOUR FOOD WOULD RUN OUT BEFORE YOU GOT MONEY TO BUY MORE.: NEVER TRUE

## 2025-08-24 SDOH — ECONOMIC STABILITY: INCOME INSECURITY: IN THE LAST 12 MONTHS, WAS THERE A TIME WHEN YOU WERE NOT ABLE TO PAY THE MORTGAGE OR RENT ON TIME?: NO

## 2025-08-24 SDOH — ECONOMIC STABILITY: TRANSPORTATION INSECURITY
IN THE PAST 12 MONTHS, HAS LACK OF TRANSPORTATION KEPT YOU FROM MEETINGS, WORK, OR FROM GETTING THINGS NEEDED FOR DAILY LIVING?: NO

## 2025-08-24 ASSESSMENT — PATIENT HEALTH QUESTIONNAIRE - PHQ9
2. FEELING DOWN, DEPRESSED OR HOPELESS: NOT AT ALL
SUM OF ALL RESPONSES TO PHQ QUESTIONS 1-9: 0
1. LITTLE INTEREST OR PLEASURE IN DOING THINGS: NOT AT ALL
SUM OF ALL RESPONSES TO PHQ QUESTIONS 1-9: 0

## 2025-08-24 ASSESSMENT — LIFESTYLE VARIABLES
HOW OFTEN DO YOU HAVE A DRINK CONTAINING ALCOHOL: NEVER
HOW MANY STANDARD DRINKS CONTAINING ALCOHOL DO YOU HAVE ON A TYPICAL DAY: 0
HOW MANY STANDARD DRINKS CONTAINING ALCOHOL DO YOU HAVE ON A TYPICAL DAY: PATIENT DOES NOT DRINK
HOW OFTEN DO YOU HAVE A DRINK CONTAINING ALCOHOL: 1
HOW OFTEN DO YOU HAVE SIX OR MORE DRINKS ON ONE OCCASION: 1

## 2025-08-27 ENCOUNTER — OFFICE VISIT (OUTPATIENT)
Age: 30
End: 2025-08-27
Payer: MEDICARE

## 2025-08-27 VITALS
SYSTOLIC BLOOD PRESSURE: 116 MMHG | BODY MASS INDEX: 40.21 KG/M2 | DIASTOLIC BLOOD PRESSURE: 83 MMHG | WEIGHT: 250.2 LBS | OXYGEN SATURATION: 93 % | HEIGHT: 66 IN | TEMPERATURE: 97.7 F | HEART RATE: 112 BPM | RESPIRATION RATE: 17 BRPM

## 2025-08-27 DIAGNOSIS — F84.5 ASPERGER'S SYNDROME: ICD-10-CM

## 2025-08-27 DIAGNOSIS — J30.1 ALLERGIC RHINITIS DUE TO POLLEN, UNSPECIFIED SEASONALITY: ICD-10-CM

## 2025-08-27 DIAGNOSIS — J45.20 MILD INTERMITTENT ASTHMA, UNCOMPLICATED: ICD-10-CM

## 2025-08-27 DIAGNOSIS — E78.5 HYPERLIPIDEMIA, UNSPECIFIED HYPERLIPIDEMIA TYPE: ICD-10-CM

## 2025-08-27 DIAGNOSIS — Z00.00 ROUTINE GENERAL MEDICAL EXAMINATION AT A HEALTH CARE FACILITY: Primary | ICD-10-CM

## 2025-08-27 PROCEDURE — G0439 PPPS, SUBSEQ VISIT: HCPCS | Performed by: FAMILY MEDICINE
